# Patient Record
Sex: MALE
[De-identification: names, ages, dates, MRNs, and addresses within clinical notes are randomized per-mention and may not be internally consistent; named-entity substitution may affect disease eponyms.]

---

## 2019-03-30 PROBLEM — Z00.00 ENCOUNTER FOR PREVENTIVE HEALTH EXAMINATION: Status: ACTIVE | Noted: 2019-03-30

## 2019-04-29 ENCOUNTER — APPOINTMENT (OUTPATIENT)
Dept: GASTROENTEROLOGY | Facility: CLINIC | Age: 54
End: 2019-04-29
Payer: COMMERCIAL

## 2019-04-29 VITALS
BODY MASS INDEX: 31.32 KG/M2 | WEIGHT: 244 LBS | OXYGEN SATURATION: 97 % | HEART RATE: 72 BPM | DIASTOLIC BLOOD PRESSURE: 80 MMHG | HEIGHT: 74 IN | RESPIRATION RATE: 16 BRPM | SYSTOLIC BLOOD PRESSURE: 120 MMHG | TEMPERATURE: 98.2 F

## 2019-04-29 DIAGNOSIS — Z82.0 FAMILY HISTORY OF EPILEPSY AND OTHER DISEASES OF THE NERVOUS SYSTEM: ICD-10-CM

## 2019-04-29 DIAGNOSIS — Z12.11 ENCOUNTER FOR SCREENING FOR MALIGNANT NEOPLASM OF COLON: ICD-10-CM

## 2019-04-29 DIAGNOSIS — Z87.891 PERSONAL HISTORY OF NICOTINE DEPENDENCE: ICD-10-CM

## 2019-04-29 DIAGNOSIS — Z78.9 OTHER SPECIFIED HEALTH STATUS: ICD-10-CM

## 2019-04-29 PROCEDURE — 99205 OFFICE O/P NEW HI 60 MIN: CPT | Mod: 25

## 2019-04-29 PROCEDURE — 36415 COLL VENOUS BLD VENIPUNCTURE: CPT

## 2019-04-29 NOTE — PHYSICAL EXAM
[General Appearance - Alert] : alert [General Appearance - In No Acute Distress] : in no acute distress [Sclera] : the sclera and conjunctiva were normal [PERRL With Normal Accommodation] : pupils were equal in size, round, and reactive to light [Examination Of The Oral Cavity] : the lips and gums were normal [Oropharynx] : the oropharynx was normal [Neck Appearance] : the appearance of the neck was normal [Neck Cervical Mass (___cm)] : no neck mass was observed [Jugular Venous Distention Increased] : there was no jugular-venous distention [Thyroid Diffuse Enlargement] : the thyroid was not enlarged [Thyroid Nodule] : there were no palpable thyroid nodules [Auscultation Breath Sounds / Voice Sounds] : lungs were clear to auscultation bilaterally [Heart Rate And Rhythm] : heart rate was normal and rhythm regular [Heart Sounds] : normal S1 and S2 [Heart Sounds Gallop] : no gallops [Murmurs] : no murmurs [Heart Sounds Pericardial Friction Rub] : no pericardial rub [Arterial Pulses Carotid] : carotid pulses were normal with no bruits [Abdominal Aorta] : the abdominal aorta was normal [Full Pulse] : the pedal pulses are present [Edema] : there was no peripheral edema [Bowel Sounds] : normal bowel sounds [Abdomen Soft] : soft [Abdomen Mass (___ Cm)] : no abdominal mass palpated [Abdomen Hernia] : no hernia was discovered [Diffuse] : diffusely [LUQ] : in the left upper quadrant [Normal Sphincter Tone] : normal sphincter tone [No Rectal Mass] : no rectal mass [Internal Hemorrhoid] : internal hemorrhoids [Occult Blood Positive] : stool was negative for occult blood [Prostate Enlargement] : the prostate was not enlarged [Cervical Lymph Nodes Enlarged Posterior Bilaterally] : posterior cervical [Cervical Lymph Nodes Enlarged Anterior Bilaterally] : anterior cervical [Supraclavicular Lymph Nodes Enlarged Bilaterally] : supraclavicular [Axillary Lymph Nodes Enlarged Bilaterally] : axillary [Femoral Lymph Nodes Enlarged Bilaterally] : femoral [Inguinal Lymph Nodes Enlarged Bilaterally] : inguinal [No CVA Tenderness] : no ~M costovertebral angle tenderness [No Spinal Tenderness] : no spinal tenderness [Abnormal Walk] : normal gait [Nail Clubbing] : no clubbing  or cyanosis of the fingernails [Musculoskeletal - Swelling] : no joint swelling seen [Motor Tone] : muscle strength and tone were normal [Skin Color & Pigmentation] : normal skin color and pigmentation [Skin Turgor] : normal skin turgor [] : no rash [Deep Tendon Reflexes (DTR)] : deep tendon reflexes were 2+ and symmetric [Sensation] : the sensory exam was normal to light touch and pinprick [No Focal Deficits] : no focal deficits [Oriented To Time, Place, And Person] : oriented to person, place, and time [Impaired Insight] : insight and judgment were intact [Affect] : the affect was normal

## 2019-04-29 NOTE — HISTORY OF PRESENT ILLNESS
[Wt Gain ___ Lbs] : recent [unfilled] ~Upound(s) weight gain [de-identified] :  Mr. KRISSY TURPIN, a 53 year man, is seen for evaluation of dyspepsia manifest by nausea, left upper quadrant pain and severe bloating.  The discomfort is related to meals.   He can note very marked abdominal distention.  He feels toxic when he awakens in the morning.   He does not smoke, chew gum or eat rapidly.  He also notes soft to loose stools every day.   He also complains of  insomnia with nausea.  The patient denies marijuana use,  heartburn, nocturnal pain, nocturnal cough,  vomiting, a change in bowel habit, dysphagia, weight loss, jaundice, melena or hematochezia.  He does note occasional blood on the paper but not in the stool.  He has had an extensive workup over the years.  I had seen him last in May 2005.    The pattern of symptoms remains the same: he feels better with breakfast and then discomfort returns about 2 hours later lasting for hours.  He can also feel well for up to 7 hours and then has discomfort until he eats.  These symptoms began as diarrhea and later constipation, in .   He has had multiple colonoscopies, upper endoscopies, CAT scans and sonograms of the abdomen.   He has had stool work-up in the past for parasites.  He has not had a GI PCR stool analysis.  He had a negative video capsule endoscopy in .   He has not had an endoscopy or colonoscopy for more than 10 years.    \par \par His father had pancreatic cancer.  His mother has a diagnosis of late onset muscular dystrophy.  There is no family history of colon cancer, colon polyp, peptic ulcer disease, female genitourinary disease, liver disease, cholelithiasis, pancreatic disease or cancer, inflammatory bowel disease or celiac disease.,\par \par \par \par      He had been treated with antibiotics in the past.  He felt better while taking intravenous antibiotics after sinus surgery.  Multiple work-ups were negative including parasite testing, lead and insecticide intoxication, CAT scan.  He has had EGD performed twice in [place] as well as a colonoscopy.  The patient denies exposure to well water, foreign travel, fever, chills, dysphagia, dyspepsia, weight loss, melena or hematochezia. \par \par Medications:  none\par \par Past History: no other medical or surgical problems; no blood transfusions.\par Social History: works as real estate executive, 		\par \par Family History:  father  at pancreatic cancer at age 49; mother, 61, a physician, healthy; 2 siblings, healthy; no children.  No other family history of gastric, colonic, female genitourinary cancer, gallbladder, or liver disease\par

## 2019-04-29 NOTE — ASSESSMENT
[FreeTextEntry1] : Assessment\par 1) long history of severe bloating, loose stools and LUQ pain could be all explained by SIBO; however, infectious cause must be ruled out; \par 2) patient has not had a colonoscopy for 10 years and is average risk for colon cancer; he notes occasional red blood on toilet tissue which could be due to anal irritation; however, polyp should be ruled out\par 3) soft to loose stools could be due to celiac disease, microscopic colitis, SIBO\par 4) abdominal pain in patient with family history of cancer of pancreas in the father, rule out pancreatic lesion\par Plan\par 1) Upper Endoscopy and  Colonoscopy risks, benefits and preparation discussed with the patient \par 2) CBC, CMP, TTG with quantitative IgA, GI PCR\par 3) patient will email me the large amount of materials on his abdominal symptoms that he has accumulated over the past decades (he forgot to bring in "the book" today) and I will review it and have it scanned into the chart\par 4) consider breath test for SIBO after negative endoscopies and blood testing\par 5) Low FODMAPP diet given and explained to the patient\par 6) office follow-p\par

## 2019-04-30 LAB
ALBUMIN SERPL ELPH-MCNC: 5 G/DL
ALP BLD-CCNC: 69 U/L
ALT SERPL-CCNC: 37 U/L
ANION GAP SERPL CALC-SCNC: 13 MMOL/L
AST SERPL-CCNC: 24 U/L
BASOPHILS # BLD AUTO: 0.07 K/UL
BASOPHILS NFR BLD AUTO: 0.7 %
BILIRUB SERPL-MCNC: 0.3 MG/DL
BUN SERPL-MCNC: 24 MG/DL
CALCIUM SERPL-MCNC: 10.1 MG/DL
CHLORIDE SERPL-SCNC: 103 MMOL/L
CO2 SERPL-SCNC: 26 MMOL/L
CREAT SERPL-MCNC: 1.16 MG/DL
CRP SERPL-MCNC: 0.21 MG/DL
EOSINOPHIL # BLD AUTO: 0.3 K/UL
EOSINOPHIL NFR BLD AUTO: 2.8 %
ERYTHROCYTE [SEDIMENTATION RATE] IN BLOOD BY WESTERGREN METHOD: 6 MM/HR
GGT SERPL-CCNC: 37 U/L
GLUCOSE SERPL-MCNC: 97 MG/DL
HCT VFR BLD CALC: 47.4 %
HGB BLD-MCNC: 15.1 G/DL
IGA SER QL IEP: 275 MG/DL
IMM GRANULOCYTES NFR BLD AUTO: 0.7 %
LYMPHOCYTES # BLD AUTO: 2.44 K/UL
LYMPHOCYTES NFR BLD AUTO: 23.2 %
MAN DIFF?: NORMAL
MCHC RBC-ENTMCNC: 29.4 PG
MCHC RBC-ENTMCNC: 31.9 GM/DL
MCV RBC AUTO: 92.4 FL
MONOCYTES # BLD AUTO: 1 K/UL
MONOCYTES NFR BLD AUTO: 9.5 %
NEUTROPHILS # BLD AUTO: 6.65 K/UL
NEUTROPHILS NFR BLD AUTO: 63.1 %
PLATELET # BLD AUTO: 297 K/UL
POTASSIUM SERPL-SCNC: 4.7 MMOL/L
PROT SERPL-MCNC: 7.8 G/DL
RBC # BLD: 5.13 M/UL
RBC # FLD: 13.1 %
SODIUM SERPL-SCNC: 141 MMOL/L
WBC # FLD AUTO: 10.53 K/UL

## 2019-05-02 LAB
TTG IGA SER IA-ACNC: <1.2 U/ML
TTG IGA SER-ACNC: NEGATIVE

## 2019-05-06 ENCOUNTER — MESSAGE (OUTPATIENT)
Age: 54
End: 2019-05-06

## 2019-05-06 LAB — GI PCR PANEL, STOOL: NORMAL

## 2019-05-08 ENCOUNTER — OUTPATIENT (OUTPATIENT)
Dept: OUTPATIENT SERVICES | Facility: HOSPITAL | Age: 54
LOS: 1 days | Discharge: HOME | End: 2019-05-08
Payer: COMMERCIAL

## 2019-05-08 ENCOUNTER — RESULT REVIEW (OUTPATIENT)
Age: 54
End: 2019-05-08

## 2019-05-08 ENCOUNTER — APPOINTMENT (OUTPATIENT)
Dept: GASTROENTEROLOGY | Facility: HOSPITAL | Age: 54
End: 2019-05-08

## 2019-05-08 DIAGNOSIS — Z12.11 ENCOUNTER FOR SCREENING FOR MALIGNANT NEOPLASM OF COLON: ICD-10-CM

## 2019-05-08 PROCEDURE — 43239 EGD BIOPSY SINGLE/MULTIPLE: CPT | Mod: GC

## 2019-05-08 PROCEDURE — 88305 TISSUE EXAM BY PATHOLOGIST: CPT | Mod: 26

## 2019-05-08 PROCEDURE — 88341 IMHCHEM/IMCYTCHM EA ADD ANTB: CPT | Mod: 26

## 2019-05-08 PROCEDURE — 88312 SPECIAL STAINS GROUP 1: CPT | Mod: 26

## 2019-05-08 PROCEDURE — 45380 COLONOSCOPY AND BIOPSY: CPT | Mod: GC

## 2019-05-08 PROCEDURE — 88342 IMHCHEM/IMCYTCHM 1ST ANTB: CPT | Mod: 26

## 2019-05-08 RX ORDER — POLYETHYLENE GLYCOL 3350, SODIUM SULFATE, SODIUM CHLORIDE, POTASSIUM CHLORIDE, ASCORBIC ACID, SODIUM ASCORBATE 7.5-2.691G
100 KIT ORAL
Qty: 1 | Refills: 0 | Status: COMPLETED | COMMUNITY
Start: 2019-05-06 | End: 2019-05-08

## 2019-05-10 ENCOUNTER — CLINICAL ADVICE (OUTPATIENT)
Age: 54
End: 2019-05-10

## 2019-05-10 LAB — SURGICAL PATHOLOGY STUDY: SIGNIFICANT CHANGE UP

## 2019-05-30 ENCOUNTER — MESSAGE (OUTPATIENT)
Age: 54
End: 2019-05-30

## 2019-08-19 ENCOUNTER — APPOINTMENT (OUTPATIENT)
Dept: GASTROENTEROLOGY | Facility: CLINIC | Age: 54
End: 2019-08-19
Payer: COMMERCIAL

## 2019-08-19 VITALS
SYSTOLIC BLOOD PRESSURE: 90 MMHG | RESPIRATION RATE: 16 BRPM | OXYGEN SATURATION: 98 % | WEIGHT: 237 LBS | HEART RATE: 67 BPM | TEMPERATURE: 97.8 F | DIASTOLIC BLOOD PRESSURE: 60 MMHG | BODY MASS INDEX: 30.42 KG/M2 | HEIGHT: 74 IN

## 2019-08-19 DIAGNOSIS — E66.9 OBESITY, UNSPECIFIED: ICD-10-CM

## 2019-08-19 DIAGNOSIS — I49.3 VENTRICULAR PREMATURE DEPOLARIZATION: ICD-10-CM

## 2019-08-19 DIAGNOSIS — R19.7 DIARRHEA, UNSPECIFIED: ICD-10-CM

## 2019-08-19 DIAGNOSIS — I49.8 OTHER SPECIFIED CARDIAC ARRHYTHMIAS: ICD-10-CM

## 2019-08-19 PROCEDURE — 99214 OFFICE O/P EST MOD 30 MIN: CPT | Mod: 25

## 2019-08-19 PROCEDURE — 36415 COLL VENOUS BLD VENIPUNCTURE: CPT

## 2019-08-20 LAB
ALBUMIN SERPL ELPH-MCNC: 4.9 G/DL
ALP BLD-CCNC: 64 U/L
ALT SERPL-CCNC: 24 U/L
ANION GAP SERPL CALC-SCNC: 14 MMOL/L
AST SERPL-CCNC: 19 U/L
BASOPHILS # BLD AUTO: 0.09 K/UL
BASOPHILS NFR BLD AUTO: 1 %
BILIRUB SERPL-MCNC: 0.4 MG/DL
BUN SERPL-MCNC: 23 MG/DL
CALCIUM SERPL-MCNC: 10.1 MG/DL
CHLORIDE SERPL-SCNC: 104 MMOL/L
CO2 SERPL-SCNC: 24 MMOL/L
CREAT SERPL-MCNC: 0.98 MG/DL
EOSINOPHIL # BLD AUTO: 0.23 K/UL
EOSINOPHIL NFR BLD AUTO: 2.4 %
GLUCOSE SERPL-MCNC: 88 MG/DL
HCT VFR BLD CALC: 46.7 %
HGB BLD-MCNC: 14.5 G/DL
IMM GRANULOCYTES NFR BLD AUTO: 0.5 %
LYMPHOCYTES # BLD AUTO: 2.31 K/UL
LYMPHOCYTES NFR BLD AUTO: 24.5 %
MAN DIFF?: NORMAL
MCHC RBC-ENTMCNC: 29.7 PG
MCHC RBC-ENTMCNC: 31 GM/DL
MCV RBC AUTO: 95.7 FL
MONOCYTES # BLD AUTO: 0.86 K/UL
MONOCYTES NFR BLD AUTO: 9.1 %
NEUTROPHILS # BLD AUTO: 5.89 K/UL
NEUTROPHILS NFR BLD AUTO: 62.5 %
PLATELET # BLD AUTO: 287 K/UL
POTASSIUM SERPL-SCNC: 4.5 MMOL/L
PROT SERPL-MCNC: 7.5 G/DL
RBC # BLD: 4.88 M/UL
RBC # FLD: 13.2 %
SODIUM SERPL-SCNC: 142 MMOL/L
WBC # FLD AUTO: 9.43 K/UL

## 2019-08-30 NOTE — ASSESSMENT
[FreeTextEntry1] : Assessment\par 1) long history of severe bloating, loose stools and LUQ pain could be all explained by SIBO; other work-up has been negative\par 2) average risk for colon cancer with negative colonoscopy in 2019; he notes occasional red blood on toilet tissue which could be due to anal irritation\par 3) soft to loose stools with celiac disease and microscopic colitis ruled out, SIBO remains a possibility\par 4) abdominal pain in patient with family history of cancer of pancreas in the father, rule out pancreatic lesion\par Plan\par 1) Breath test for SIBO\par 2) CBC, CMP\par 3) patient will email me the large amount of materials on his abdominal symptoms that he has accumulated over the past decades (he forgot to bring in "the book" today) and I will review it and have it scanned into the chart\par 4) will hold on video capsule endoscopy until after breath test results\par 5) Low FODMAPP diet explained again to the patient\par 6) office follow-up\par

## 2019-08-30 NOTE — PHYSICAL EXAM
[General Appearance - Alert] : alert [General Appearance - In No Acute Distress] : in no acute distress [PERRL With Normal Accommodation] : pupils were equal in size, round, and reactive to light [Sclera] : the sclera and conjunctiva were normal [Examination Of The Oral Cavity] : the lips and gums were normal [Oropharynx] : the oropharynx was normal [Neck Appearance] : the appearance of the neck was normal [Neck Cervical Mass (___cm)] : no neck mass was observed [Jugular Venous Distention Increased] : there was no jugular-venous distention [Thyroid Diffuse Enlargement] : the thyroid was not enlarged [Thyroid Nodule] : there were no palpable thyroid nodules [Auscultation Breath Sounds / Voice Sounds] : lungs were clear to auscultation bilaterally [Heart Rate And Rhythm] : heart rate was normal and rhythm regular [Heart Sounds] : normal S1 and S2 [Heart Sounds Gallop] : no gallops [Murmurs] : no murmurs [Heart Sounds Pericardial Friction Rub] : no pericardial rub [Arterial Pulses Carotid] : carotid pulses were normal with no bruits [Abdominal Aorta] : the abdominal aorta was normal [Full Pulse] : the pedal pulses are present [Edema] : there was no peripheral edema [Bowel Sounds] : normal bowel sounds [Abdomen Soft] : soft [Abdomen Mass (___ Cm)] : no abdominal mass palpated [Abdomen Hernia] : no hernia was discovered [Diffuse] : diffusely [LUQ] : in the left upper quadrant [Normal Sphincter Tone] : normal sphincter tone [No Rectal Mass] : no rectal mass [Internal Hemorrhoid] : internal hemorrhoids [Prostate Enlargement] : the prostate was not enlarged [Cervical Lymph Nodes Enlarged Posterior Bilaterally] : posterior cervical [Cervical Lymph Nodes Enlarged Anterior Bilaterally] : anterior cervical [Supraclavicular Lymph Nodes Enlarged Bilaterally] : supraclavicular [Axillary Lymph Nodes Enlarged Bilaterally] : axillary [Inguinal Lymph Nodes Enlarged Bilaterally] : inguinal [Femoral Lymph Nodes Enlarged Bilaterally] : femoral [No CVA Tenderness] : no ~M costovertebral angle tenderness [No Spinal Tenderness] : no spinal tenderness [Abnormal Walk] : normal gait [Nail Clubbing] : no clubbing  or cyanosis of the fingernails [Musculoskeletal - Swelling] : no joint swelling seen [Motor Tone] : muscle strength and tone were normal [Skin Color & Pigmentation] : normal skin color and pigmentation [Skin Turgor] : normal skin turgor [] : no rash [Deep Tendon Reflexes (DTR)] : deep tendon reflexes were 2+ and symmetric [Sensation] : the sensory exam was normal to light touch and pinprick [No Focal Deficits] : no focal deficits [Oriented To Time, Place, And Person] : oriented to person, place, and time [Impaired Insight] : insight and judgment were intact [Affect] : the affect was normal [Occult Blood Positive] : stool was negative for occult blood

## 2019-08-30 NOTE — HISTORY OF PRESENT ILLNESS
[Wt Gain ___ Lbs] : recent [unfilled] ~Upound(s) weight gain [de-identified] :  Mr. KRISSY TURPIN, a 53 year man, is seen for follow-up evaluation of dyspepsia manifest by nausea, left upper quadrant pain and severe bloating.  The discomfort is related to meals.   He can note very marked abdominal distention.  He feels "toxic" when he awakens in the morning.   He does not smoke, chew gum or eat rapidly.  He also notes soft to loose stools every day.   The workup for loose stools included GI PCR, CRP, colonic biopsies for colitis, small bowel biopsies:  all negative or normal.  He is very frustrated that these symptoms persist in spite of negative findings.  He was to have a breath tests for SIBO but it was not arranged.\par He also complains of  insomnia with nausea.  The patient denies marijuana use,  heartburn, nocturnal pain, nocturnal cough,  vomiting, a change in bowel habit, dysphagia, weight loss, jaundice, melena or hematochezia.  He does note occasional blood on the paper but not in the stool.  He has had an extensive workup over the years.  I had seen him last in May 2005.    The pattern of symptoms remains the same: he feels better with breakfast and then discomfort returns about 2 hours later lasting for hours.  He can also feel well for up to 7 hours and then has discomfort until he eats.  These symptoms began as diarrhea and later constipation, in .   He has had multiple colonoscopies, upper endoscopies, CAT scans and sonograms of the abdomen.   He has had stool work-up in the past for parasites.    He had a negative video capsule endoscopy in .\par \par His father had pancreatic cancer.  His mother has a diagnosis of late onset muscular dystrophy.  There is no family history of colon cancer, colon polyp, peptic ulcer disease, female genitourinary disease, liver disease, cholelithiasis, pancreatic disease or cancer, inflammatory bowel disease or celiac disease.,\par \par He had been treated with antibiotics in the past.  He felt better while taking intravenous antibiotics after sinus surgery.  Multiple work-ups were negative including parasite testing, lead and insecticide intoxication, CAT scan.  He has had EGD performed twice in [place] as well as a colonoscopy.  The patient denies exposure to well water, foreign travel, fever, chills, dysphagia, dyspepsia, weight loss, melena or hematochezia. \par \par Medications:  none\par \par Past History: no other medical or surgical problems; no blood transfusions.\par Social History: works as real estate executive, 		\par \par Family History:  father  at pancreatic cancer at age 49; mother, 61, a physician, healthy; 2 siblings, healthy; no children.  No other family history of gastric, colonic, female genitourinary cancer, gallbladder, or liver disease\par

## 2019-08-30 NOTE — REASON FOR VISIT
[Initial Evaluation] : an initial evaluation [Follow-Up: _____] : a [unfilled] follow-up visit [FreeTextEntry1] : abdominal pain

## 2019-10-30 ENCOUNTER — MESSAGE (OUTPATIENT)
Age: 54
End: 2019-10-30

## 2019-11-11 ENCOUNTER — APPOINTMENT (OUTPATIENT)
Dept: GASTROENTEROLOGY | Facility: CLINIC | Age: 54
End: 2019-11-11
Payer: COMMERCIAL

## 2019-11-11 PROCEDURE — 91065 BREATH HYDROGEN/METHANE TEST: CPT

## 2019-11-13 ENCOUNTER — MESSAGE (OUTPATIENT)
Age: 54
End: 2019-11-13

## 2019-11-13 ENCOUNTER — RESULT REVIEW (OUTPATIENT)
Age: 54
End: 2019-11-13

## 2019-11-27 ENCOUNTER — MESSAGE (OUTPATIENT)
Age: 54
End: 2019-11-27

## 2019-11-27 DIAGNOSIS — R10.12 LEFT UPPER QUADRANT PAIN: ICD-10-CM

## 2019-12-09 ENCOUNTER — MESSAGE (OUTPATIENT)
Age: 54
End: 2019-12-09

## 2019-12-16 ENCOUNTER — APPOINTMENT (OUTPATIENT)
Dept: GASTROENTEROLOGY | Facility: CLINIC | Age: 54
End: 2019-12-16
Payer: COMMERCIAL

## 2019-12-16 PROCEDURE — 91110 GI TRC IMG INTRAL ESOPH-ILE: CPT

## 2019-12-17 ENCOUNTER — RESULT REVIEW (OUTPATIENT)
Age: 54
End: 2019-12-17

## 2019-12-17 DIAGNOSIS — K28.9 GASTROJEJUNAL ULCER, UNSPECIFIED AS ACUTE OR CHRONIC, W/OUT HEMORRHAGE OR PERFORATION: ICD-10-CM

## 2019-12-17 DIAGNOSIS — K63.89 OTHER SPECIFIED DISEASES OF INTESTINE: ICD-10-CM

## 2019-12-17 RX ORDER — AMIODARONE HYDROCHLORIDE 200 MG/1
200 TABLET ORAL DAILY
Refills: 0 | Status: DISCONTINUED | COMMUNITY
Start: 2019-08-19 | End: 2019-12-17

## 2019-12-17 RX ORDER — AMOXICILLIN AND CLAVULANATE POTASSIUM 500; 125 MG/1; MG/1
500-125 TABLET, FILM COATED ORAL TWICE DAILY
Qty: 28 | Refills: 3 | Status: ACTIVE | COMMUNITY
Start: 2019-11-13 | End: 1900-01-01

## 2020-01-10 ENCOUNTER — APPOINTMENT (OUTPATIENT)
Dept: CT IMAGING | Facility: CLINIC | Age: 55
End: 2020-01-10
Payer: COMMERCIAL

## 2020-01-10 ENCOUNTER — OUTPATIENT (OUTPATIENT)
Dept: OUTPATIENT SERVICES | Facility: HOSPITAL | Age: 55
LOS: 1 days | End: 2020-01-10

## 2020-01-10 PROCEDURE — 74177 CT ABD & PELVIS W/CONTRAST: CPT | Mod: 26

## 2020-01-22 ENCOUNTER — MESSAGE (OUTPATIENT)
Age: 55
End: 2020-01-22

## 2020-12-31 PROBLEM — K63.89 SMALL INTESTINAL BACTERIAL OVERGROWTH: Status: ACTIVE | Noted: 2019-12-17
